# Patient Record
(demographics unavailable — no encounter records)

---

## 2025-07-16 NOTE — HISTORY OF PRESENT ILLNESS
[8] : 8 [7] : 7 [Dull/Aching] : dull/aching [Shooting] : shooting [Ice] : ice [] : no [FreeTextEntry1] : Left pinky toe [FreeTextEntry3] : 7/16/25 [FreeTextEntry4] : 3:45PM [FreeTextEntry5] : Patient reports she was barefoot in the garage and stubbed her toe on a cement bag. [de-identified] : Pressure  [de-identified] : Teacher

## 2025-07-16 NOTE — ASSESSMENT
[FreeTextEntry1] : Post op shoe given   Gael tape applied   Recommend: - rest - ice - compression - elevation   Follow up in 1 week

## 2025-07-16 NOTE — PHYSICAL EXAM
[Left] : left foot and ankle [NL (40)] : plantar flexion 40 degrees [NL 30)] : inversion 30 degrees [NL (20)] : eversion 20 degrees [] : full range of motion of foot [2+] : posterior tibialis pulse: 2+ [Normal] : saphenous nerve sensation normal [FreeTextEntry8] : TTP over left fifth proximal phalynx  [de-identified] : deferred

## 2025-07-16 NOTE — IMAGING
[Left] : left foot [The fracture is in acceptable alignment. There is progression in healing seen] : The fracture is in acceptable alignment. There is progression in healing seen [de-identified] : fracture of left fifth proximal phalynx

## 2025-07-24 NOTE — HISTORY OF PRESENT ILLNESS
[Sudden] : sudden [Radiating] : radiating [Throbbing] : throbbing [Standing] : standing [] : yes [de-identified] : Pt. is a 50 year old female who presents for evaluation of her LT 5th toe. Reports accidently stubbing it against a bag of cement on 7/16/25. Evaluated at [facility] and diagnosed with fracture. WB in post op shoe, oscar taping. No previous injury/problem with LT foot.  [FreeTextEntry1] : left foot  [FreeTextEntry3] : 7-16-25 [FreeTextEntry5] : pt stubbed on a bag in cement  [FreeTextEntry7] : toe [FreeTextEntry9] : open toe shoe [de-identified] : GIOVANI SANTAMARIA [de-identified] : xr [facility]

## 2025-07-24 NOTE — PHYSICAL EXAM
[5th] : 5th [NL (40)] : plantar flexion 40 degrees [NL 30)] : inversion 30 degrees [NL (20)] : eversion 20 degrees [5___] : eversion 5[unfilled]/5 [2+] : posterior tibialis pulse: 2+ [Normal] : saphenous nerve sensation normal [] : mildly antalgic [Left] : left toe [Toe #: ____] : toe # [unfilled] [There are no fractures, subluxations or dislocations. No significant abnormalities are seen] : There are no fractures, subluxations or dislocations. No significant abnormalities are seen [FreeTextEntry3] : Swelling and ecchymosis of 5th toe.  [de-identified] : WB in post op shoe.  [de-identified] :  AP, lateral and oblique xray views were taken and reviewed today. Nondisplaced fracture 5th proximal phalanx.

## 2025-07-24 NOTE — DISCUSSION/SUMMARY
[de-identified] : WBAT in hard sole shoe. Ice to affected area. Gael taping toes. Meloxicam 15mg daily is recommended.  She states she has it at home.  Repeat x-ray will be performed at the next office visit

## 2025-07-24 NOTE — DATA REVIEWED
[Outside X-rays] : outside x-rays [Left] : left [I independently reviewed and interpreted images and report] : I independently reviewed and interpreted images and report [I reviewed the films/CD] : I reviewed the films/CD [FreeTextEntry1] : OCMSG: Nondisplaced fracture 5th proximal phalanx.